# Patient Record
Sex: MALE | Race: WHITE | NOT HISPANIC OR LATINO
[De-identification: names, ages, dates, MRNs, and addresses within clinical notes are randomized per-mention and may not be internally consistent; named-entity substitution may affect disease eponyms.]

---

## 2021-03-12 ENCOUNTER — TRANSCRIPTION ENCOUNTER (OUTPATIENT)
Age: 41
End: 2021-03-12

## 2021-03-17 ENCOUNTER — OUTPATIENT (OUTPATIENT)
Dept: OUTPATIENT SERVICES | Facility: HOSPITAL | Age: 41
LOS: 1 days | End: 2021-03-17
Payer: COMMERCIAL

## 2021-03-17 DIAGNOSIS — Z11.52 ENCOUNTER FOR SCREENING FOR COVID-19: ICD-10-CM

## 2021-03-18 LAB — SARS-COV-2 RNA SPEC QL NAA+PROBE: SIGNIFICANT CHANGE UP

## 2021-03-18 PROCEDURE — U0005: CPT

## 2021-03-18 PROCEDURE — U0003: CPT

## 2021-03-18 PROCEDURE — C9803: CPT

## 2023-01-06 ENCOUNTER — EMERGENCY (EMERGENCY)
Facility: HOSPITAL | Age: 43
LOS: 1 days | Discharge: HOME | End: 2023-01-06
Attending: EMERGENCY MEDICINE | Admitting: EMERGENCY MEDICINE
Payer: COMMERCIAL

## 2023-01-06 VITALS
OXYGEN SATURATION: 98 % | DIASTOLIC BLOOD PRESSURE: 95 MMHG | TEMPERATURE: 98 F | SYSTOLIC BLOOD PRESSURE: 138 MMHG | HEART RATE: 74 BPM | RESPIRATION RATE: 16 BRPM | WEIGHT: 175.05 LBS

## 2023-01-06 PROCEDURE — 72125 CT NECK SPINE W/O DYE: CPT | Mod: 26,MA

## 2023-01-06 PROCEDURE — 96372 THER/PROPH/DIAG INJ SC/IM: CPT

## 2023-01-06 PROCEDURE — 99284 EMERGENCY DEPT VISIT MOD MDM: CPT | Mod: 25

## 2023-01-06 PROCEDURE — 72125 CT NECK SPINE W/O DYE: CPT | Mod: MA

## 2023-01-06 PROCEDURE — 73030 X-RAY EXAM OF SHOULDER: CPT | Mod: 26,RT

## 2023-01-06 PROCEDURE — 99284 EMERGENCY DEPT VISIT MOD MDM: CPT

## 2023-01-06 PROCEDURE — 73030 X-RAY EXAM OF SHOULDER: CPT

## 2023-01-06 RX ORDER — OXYCODONE HYDROCHLORIDE 5 MG/1
5 TABLET ORAL ONCE
Refills: 0 | Status: DISCONTINUED | OUTPATIENT
Start: 2023-01-06 | End: 2023-01-06

## 2023-01-06 RX ORDER — KETOROLAC TROMETHAMINE 30 MG/ML
30 SYRINGE (ML) INJECTION ONCE
Refills: 0 | Status: DISCONTINUED | OUTPATIENT
Start: 2023-01-06 | End: 2023-01-06

## 2023-01-06 RX ORDER — CYCLOBENZAPRINE HYDROCHLORIDE 10 MG/1
1 TABLET, FILM COATED ORAL
Qty: 15 | Refills: 0
Start: 2023-01-06 | End: 2023-01-10

## 2023-01-06 RX ORDER — OXYCODONE AND ACETAMINOPHEN 5; 325 MG/1; MG/1
1 TABLET ORAL
Qty: 20 | Refills: 0
Start: 2023-01-06 | End: 2023-01-10

## 2023-01-06 RX ORDER — CYCLOBENZAPRINE HYDROCHLORIDE 10 MG/1
10 TABLET, FILM COATED ORAL ONCE
Refills: 0 | Status: COMPLETED | OUTPATIENT
Start: 2023-01-06 | End: 2023-01-06

## 2023-01-06 RX ADMIN — OXYCODONE HYDROCHLORIDE 5 MILLIGRAM(S): 5 TABLET ORAL at 10:35

## 2023-01-06 RX ADMIN — Medication 30 MILLIGRAM(S): at 10:35

## 2023-01-06 RX ADMIN — CYCLOBENZAPRINE HYDROCHLORIDE 10 MILLIGRAM(S): 10 TABLET, FILM COATED ORAL at 10:36
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
## 2023-01-06 NOTE — ED PROVIDER NOTE - PHYSICAL EXAMINATION
T(C): 36.8 (01-06-23 @ 09:48), Max: 36.8 (01-06-23 @ 09:48)  HR: 74 (01-06-23 @ 09:48) (74 - 74)  BP: 138/95 (01-06-23 @ 09:48) (138/95 - 138/95)  RR: 16 (01-06-23 @ 09:48) (16 - 16)  SpO2: 98% (01-06-23 @ 09:48) (98% - 98%)  Wt(kg): --    Physical Exam:   GENERAL: well-groomed, in position of comfort 2/2 muscle spasms  HEENT: head NC/AT;   conjunctiva & sclera clear; hearing grossly intact, moist mucous membranes  NECK: supple, no JVD, + R trapezius tightness  RESPIRATORY: non labored breathing on RA, CTA B/L, no wheezing, rales, rhonchi or rubs  CARDIOVASCULAR: S1&S2, RRR, no murmurs or gallops  ABDOMEN: soft, non-tender, non-distended, + Bowel sounds x4 quadrants, no guarding, rebound or rigidity  MUSCULOSKELETAL:  no clubbing, cyanosis or edema of all 4 extremities  VASCULAR: Radial pulses 2+ bilaterally   SKIN: warm and dry, color normal, capillary refill BL fingers WNL  EXTREMITIES: negative tinel BL, negative OK sign BL, negative pronator drift, shoulder ROM WNL BL  NEUROLOGIC: AA&O X3, motor Strength 5/5 in UE & LE B/L, speech intact - non slurred  Psych: Normal mood and affect, normal behavior

## 2023-01-06 NOTE — ED ADULT NURSE NOTE - NSIMPLEMENTINTERV_GEN_ALL_ED
Implemented All Universal Safety Interventions:  [place] to call system. Call bell, personal items and telephone within reach. Instruct patient to call for assistance. Room bathroom lighting operational. Non-slip footwear when patient is off stretcher. Physically safe environment: no spills, clutter or unnecessary equipment. Stretcher in lowest position, wheels locked, appropriate side rails in place.

## 2023-01-06 NOTE — ED PROVIDER NOTE - NSICDXFAMILYHX_GEN_ALL_CORE_FT
FAMILY HISTORY:  Father  Still living? Unknown  FH: myocardial infarction, Age at diagnosis: Age Unknown  FH: type 2 diabetes, Age at diagnosis: Age Unknown    Mother  Still living? Unknown  FH: skin cancer, Age at diagnosis: Age Unknown    Aunt  Still living? Unknown  FH: lung cancer, Age at diagnosis: Age Unknown

## 2023-01-06 NOTE — ED PROVIDER NOTE - PROGRESS NOTE DETAILS
Pt states pain decreased from 10/10 --> 3/10 w/improved ROM in arm and neck. Pt states frequency and intensity of muscle spasms has also decreased. Updated pt at bedside and wife (pathologist) on the phone regarding CT cervical spine results and new Rx sent to pharmacy. Pt verbalized understanding of importance of following up with Orthopedist/Pain Management.
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
## 2023-01-06 NOTE — ED PROVIDER NOTE - CARE PROVIDER_API CALL
William Chavez (DO)  Orthopaedic Surgery  [address] [place] [address]  Phone: [phone]  Fax: [phone]  Follow Up Time:

## 2023-01-06 NOTE — ED PROVIDER NOTE - NSFOLLOWUPINSTRUCTIONS_ED_ALL_ED_FT
Cervical radiculopathy happens when a nerve in the neck (a cervical nerve) is pinched or bruised. This condition can happen because of an injury to the cervical spine (vertebrae) in the neck, or as part of the normal aging process. Pressure on the cervical nerves can cause pain or numbness that travels from the neck all the way down to the arm and fingers. This condition usually gets better with rest. Treatment may be needed if the condition does not improve.      What are the causes?    This condition may be caused by:  •A neck injury.      •A bulging (herniated) disk.      •Muscle spasms.      •Muscle tightness in the neck due to overuse.      •Arthritis.      •Breakdown or degeneration in the bones and joints of the spine (spondylosis) due to aging.      •Bone spurs that may develop near the cervical nerves.        What are the signs or symptoms?    Symptoms of this condition include:  •Pain. The pain may travel from the neck to the arm and hand. The pain can be severe or irritating. It may get worse when you move your neck.      •Numbness or tingling in your arm or hand.      •Weakness in the affected arm and hand, in severe cases.        How is this diagnosed?    This condition may be diagnosed based on your symptoms, your medical history, and a physical exam. You may also have tests, including:  •X-rays.      •CT scan.      •MRI.      •Electromyogram (EMG).      •Nerve conduction tests.        How is this treated?    In many cases, treatment is not needed for this condition. With rest, the condition usually gets better over time. If treatment is needed, options may include:  •Wearing a soft neck collar (cervical collar) for short periods of time.      •Doing physical therapy to strengthen your neck muscles.      •Taking medicines. These may include NSAIDs, such as ibuprofen, or oral corticosteroids.      •Having spinal injections, in severe cases.      •Having surgery. This may be needed if other treatments do not help. Different types of surgery may be done depending on the cause of this condition.        Follow these instructions at home:    If you have a cervical collar:     •Wear it as told by your health care provider. Remove it only as told by your health care provider.    •Ask your health care provider if you can remove the cervical collar for cleaning and bathing. If you are allowed to remove the collar for cleaning or bathing:  •Follow instructions from your health care provider about how to remove the collar safely.      •Clean the collar by wiping it with mild soap and water and drying it completely.      •Take out any removable pads in the collar every 1–2 days, and wash them by hand with soap and water. Let them air-dry completely before you put them back in the collar.      •Check your skin under the collar for irritation or sores. If you see any, tell your health care provider.          Managing pain       Bag of ice on a towel on the skin.        A heating pad for use on the painful area.     •Take over-the-counter and prescription medicines only as told by your health care provider.    •If directed, put ice on the affected area. To do this:  •If you have a soft neck collar, remove it as told by your health care provider.      •Put ice in a plastic bag.      •Place a towel between your skin and the bag.      •Leave the ice on for 20 minutes, 2–3 times a day.      •Remove the ice if your skin turns bright red. This is very important. If you cannot feel pain, heat, or cold, you have a greater risk of damage to the area.      •If applying ice does not help, you can try using heat. Use the heat source that your health care provider recommends, such as a moist heat pack or a heating pad.  •Place a towel between your skin and the heat source.      •Leave the heat on for 20–30 minutes.      •Remove the heat if your skin turns bright red. This is especially important if you are unable to feel pain, heat, or cold. You have a greater risk of getting burned.        •Try a gentle neck and shoulder massage to help relieve symptoms.      Activity     •Rest as needed.      •Return to your normal activities as told by your health care provider. Ask your health care provider what activities are safe for you.      •Do stretching and strengthening exercises as told by your health care provider or your physical therapist.      •You may have to avoid lifting. Ask your health care provider how much you can safely lift.      General instructions     •Use a flat pillow when you sleep.      • Do not drive while wearing a cervical collar. If you do not have a cervical collar, ask your health care provider if it is safe to drive while your neck heals.      •Ask your health care provider if the medicine prescribed to you requires you to avoid driving or using machinery.      • Do not use any products that contain nicotine or tobacco. These products include cigarettes, chewing tobacco, and vaping devices, such as e-cigarettes. If you need help quitting, ask your health care provider.      •Keep all follow-up visits. This is important.        Contact a health care provider if:    •Your condition does not improve with treatment.        Get help right away if:    •Your pain gets much worse and is not controlled with medicines.      •You have weakness or numbness in your hand, arm, face, or leg.      •You have a high fever.      •You have a stiff, rigid neck.      •You lose control of your bowels or your bladder (have incontinence).      •You have trouble with walking, balance, or speaking.        Summary    •Cervical radiculopathy happens when a nerve in the neck is pinched or bruised.      •A nerve can get pinched from a bulging disk, arthritis, muscle spasms, or an injury to the neck.      •Symptoms include pain, tingling, or numbness radiating from the neck to the arm or hand. Weakness can also occur in severe cases.      •Treatment may include rest, wearing a cervical collar, and physical therapy. Medicines may be prescribed to help with pain. In severe cases, injections or surgery may be needed.      This information is not intended to replace advice given to you by your health care provider. Make sure you discuss any questions you have with your health care provider.      Document Revised: 06/23/2022 Document Reviewed: 06/23/2022    Elsevier Patient Education © 2022 [employer]

## 2023-01-06 NOTE — ED PROVIDER NOTE - NSICDXPASTMEDICALHX_GEN_ALL_CORE_FT
PAST MEDICAL HISTORY:  History of hepatitis A 2000    History of herniated intervertebral disc cervical, thoracic, lumbar    HLD (hyperlipidemia)

## 2023-01-06 NOTE — ED PROVIDER NOTE - PATIENT PORTAL LINK FT
You can access the FollowMyHealth Patient Portal offered by [facility] by registering at the following website: [email or web address]. By joining [facility]’s FollowMyHealth portal, you will also be able to view your health information using other applications (apps) compatible with our system.